# Patient Record
Sex: MALE | Race: WHITE | URBAN - METROPOLITAN AREA
[De-identification: names, ages, dates, MRNs, and addresses within clinical notes are randomized per-mention and may not be internally consistent; named-entity substitution may affect disease eponyms.]

---

## 2019-08-12 ENCOUNTER — Encounter (OUTPATIENT)
Dept: URBAN - METROPOLITAN AREA CLINIC 71 | Facility: CLINIC | Age: 84
End: 2019-08-12
Payer: MEDICARE

## 2019-08-12 PROCEDURE — 92014 COMPRE OPH EXAM EST PT 1/>: CPT | Performed by: OPHTHALMOLOGY

## 2019-08-12 PROCEDURE — 92134 CPTRZ OPH DX IMG PST SGM RTA: CPT | Performed by: OPHTHALMOLOGY

## 2019-08-26 ENCOUNTER — Encounter (OUTPATIENT)
Dept: URBAN - METROPOLITAN AREA CLINIC 72 | Facility: CLINIC | Age: 84
End: 2019-08-26
Payer: MEDICARE

## 2020-03-12 ENCOUNTER — OFFICE VISIT (OUTPATIENT)
Dept: URBAN - METROPOLITAN AREA CLINIC 71 | Facility: CLINIC | Age: 85
End: 2020-03-12
Payer: MEDICARE

## 2020-03-12 DIAGNOSIS — H02.135 SENILE ECTROPION OF LEFT LOWER EYELID: ICD-10-CM

## 2020-03-12 DIAGNOSIS — H35.3131 NONEXUDATIVE AGE-RELATED MACULAR DEGENERATION, BILATERAL, EARLY DRY STAGE: Primary | ICD-10-CM

## 2020-03-12 PROCEDURE — 99213 OFFICE O/P EST LOW 20 MIN: CPT | Performed by: OPHTHALMOLOGY

## 2020-03-12 PROCEDURE — 92134 CPTRZ OPH DX IMG PST SGM RTA: CPT | Performed by: OPHTHALMOLOGY

## 2020-03-12 RX ORDER — METOPROLOL TARTRATE 25 MG/1
25 MG TABLET, FILM COATED ORAL
Qty: 0 | Refills: 0 | Status: ACTIVE
Start: 2020-03-12

## 2020-03-12 ASSESSMENT — INTRAOCULAR PRESSURE
OS: 9
OD: 6

## 2020-03-12 ASSESSMENT — KERATOMETRY
OD: 43.88
OS: 43.75

## 2020-09-16 ENCOUNTER — OFFICE VISIT (OUTPATIENT)
Dept: URBAN - METROPOLITAN AREA CLINIC 71 | Facility: CLINIC | Age: 85
End: 2020-09-16
Payer: MEDICARE

## 2020-09-16 PROCEDURE — 92014 COMPRE OPH EXAM EST PT 1/>: CPT | Performed by: OPHTHALMOLOGY

## 2020-09-16 PROCEDURE — 92134 CPTRZ OPH DX IMG PST SGM RTA: CPT | Performed by: OPHTHALMOLOGY

## 2020-09-16 ASSESSMENT — KERATOMETRY
OD: 43.88
OS: 44.00

## 2020-09-16 ASSESSMENT — INTRAOCULAR PRESSURE
OD: 8
OS: 10

## 2020-09-16 NOTE — IMPRESSION/PLAN
Impression: Nonexudative age-related macular degeneration, bilateral, intermediate dry stage: H35.3132. OCt ordered today 9/16/20 200/200 stable no progression Plan: Discussed diagnosis in detail with patient. Discussed treatment options with patient. Vitamins recommended. Use of [place]msler grid was explained. Will continue to observe condition and or symptoms. Call if [address] worsens.

## 2020-09-16 NOTE — IMPRESSION/PLAN
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 Plan: Discussed the surgery of having the ectropions fixed and if he choose to move forward would refer him out to Dr Naga Membreno for an eval and discussion of the ectropion surgery.

## 2021-05-20 ENCOUNTER — OFFICE VISIT (OUTPATIENT)
Dept: URBAN - METROPOLITAN AREA CLINIC 71 | Facility: CLINIC | Age: 86
End: 2021-05-20
Payer: MEDICARE

## 2021-05-20 DIAGNOSIS — H02.132 SENILE ECTROPION OF RIGHT LOWER EYELID: ICD-10-CM

## 2021-05-20 DIAGNOSIS — H35.3132 NONEXUDATIVE AGE-RELATED MACULAR DEGENERATION, BILATERAL, INTERMEDIATE DRY STAGE: Primary | ICD-10-CM

## 2021-05-20 DIAGNOSIS — H43.813 VITREOUS DEGENERATION, BILATERAL: ICD-10-CM

## 2021-05-20 PROCEDURE — 99213 OFFICE O/P EST LOW 20 MIN: CPT | Performed by: OPHTHALMOLOGY

## 2021-05-20 PROCEDURE — 92134 CPTRZ OPH DX IMG PST SGM RTA: CPT | Performed by: OPHTHALMOLOGY

## 2021-05-20 ASSESSMENT — INTRAOCULAR PRESSURE
OD: 9
OS: 10

## 2021-05-20 NOTE — IMPRESSION/PLAN
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 Plan: Discussed the surgery of having the ectropions fixed and if he choose to move forward would refer him out to Dr Moreen Boxer for an eval and discussion of the ectropion surgery. Also discussed having patient put an ointment in the lower lids.

## 2021-05-20 NOTE — IMPRESSION/PLAN
Impression: Nonexudative age-related macular degeneration, bilateral, intermediate dry stage: H35.3132. Plan: Macular degeneration, dry type, appears progressed with decreased vision. 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

## 2021-09-30 ENCOUNTER — OFFICE VISIT (OUTPATIENT)
Dept: URBAN - METROPOLITAN AREA CLINIC 72 | Facility: CLINIC | Age: 86
End: 2021-09-30

## 2021-09-30 DIAGNOSIS — H52.4 PRESBYOPIA: Primary | ICD-10-CM

## 2021-09-30 ASSESSMENT — VISUAL ACUITY
OS: 20/80
OD: 20/50

## 2021-09-30 ASSESSMENT — INTRAOCULAR PRESSURE
OD: 11
OS: 10

## 2022-06-28 ENCOUNTER — OFFICE VISIT (OUTPATIENT)
Dept: URBAN - METROPOLITAN AREA CLINIC 71 | Facility: CLINIC | Age: 87
End: 2022-06-28
Payer: MEDICARE

## 2022-06-28 DIAGNOSIS — H02.135 SENILE ECTROPION OF LEFT LOWER EYELID: ICD-10-CM

## 2022-06-28 DIAGNOSIS — H01.009 BLEPHARITIS OF EYELID: Primary | ICD-10-CM

## 2022-06-28 DIAGNOSIS — H02.132 SENILE ECTROPION OF RIGHT LOWER EYELID: ICD-10-CM

## 2022-06-28 PROCEDURE — 99212 OFFICE O/P EST SF 10 MIN: CPT

## 2022-06-28 RX ORDER — NEOMYCIN SULFATE, POLYMYXIN B SULFATE AND DEXAMETHASONE 3.5; 10000; 1 MG/ML; [USP'U]/ML; MG/ML
SUSPENSION/ DROPS OPHTHALMIC
Qty: 5 | Refills: 0 | Status: ACTIVE
Start: 2022-06-28

## 2022-06-28 ASSESSMENT — INTRAOCULAR PRESSURE
OD: 10
OS: 12

## 2022-06-28 NOTE — IMPRESSION/PLAN
Impression: Blepharitis of eyelid: H01.009. Bilateral. Plan: Discussed. Recommend patient use eye lid scrubs to keep eyelids clean and will have patient start Corbin-Poly-Dex gtts BID OU for 1 week to help inflammation.

## 2022-06-28 NOTE — IMPRESSION/PLAN
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 Plan: Discussed. Will continue to monitor.

## 2022-07-06 ENCOUNTER — OFFICE VISIT (OUTPATIENT)
Dept: URBAN - METROPOLITAN AREA CLINIC 72 | Facility: CLINIC | Age: 87
End: 2022-07-06
Payer: MEDICARE

## 2022-07-06 DIAGNOSIS — H01.009 BLEPHARITIS OF EYELID: Primary | ICD-10-CM

## 2022-07-06 DIAGNOSIS — H04.123 DRY EYE SYNDROME OF BILATERAL LACRIMAL GLANDS: ICD-10-CM

## 2022-07-06 DIAGNOSIS — H02.132 SENILE ECTROPION OF RIGHT LOWER EYELID: ICD-10-CM

## 2022-07-06 DIAGNOSIS — H02.135 SENILE ECTROPION OF LEFT LOWER EYELID: ICD-10-CM

## 2022-07-06 PROCEDURE — 99212 OFFICE O/P EST SF 10 MIN: CPT

## 2022-07-06 ASSESSMENT — INTRAOCULAR PRESSURE
OD: 10
OS: 13

## 2022-07-06 NOTE — IMPRESSION/PLAN
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 Discussed treatment options with patient, including SX, though none is required. Pt voiced understanding and would like to monitor for changes.  Will monitor, pt to RTC if would like re-discuss SX.

## 2022-07-06 NOTE — IMPRESSION/PLAN
Impression: Blepharitis of eyelid: H01.009 Bilateral.

has improved Plan: Discussed DX and today's findings with pt Recommend pt to continue with lid scrubs and warm compresses Pt to finish course of Corbin/Poly/Dex gtts then D/c Pt understands

## 2022-08-17 ENCOUNTER — OFFICE VISIT (OUTPATIENT)
Dept: URBAN - METROPOLITAN AREA CLINIC 72 | Facility: CLINIC | Age: 87
End: 2022-08-17
Payer: MEDICARE

## 2022-08-17 DIAGNOSIS — H02.132 SENILE ECTROPION OF RIGHT LOWER EYELID: ICD-10-CM

## 2022-08-17 DIAGNOSIS — H04.123 DRY EYE SYNDROME OF BILATERAL LACRIMAL GLANDS: ICD-10-CM

## 2022-08-17 DIAGNOSIS — H43.813 VITREOUS DEGENERATION, BILATERAL: ICD-10-CM

## 2022-08-17 DIAGNOSIS — H35.3133 NONEXUDATIVE AGE-RELATED MACULAR DEGENERATION, BILATERAL, ADVANCED ATROPHIC WITHOUT SUBFOVEAL INVOLVEMENT: Primary | ICD-10-CM

## 2022-08-17 DIAGNOSIS — H02.135 SENILE ECTROPION OF LEFT LOWER EYELID: ICD-10-CM

## 2022-08-17 PROCEDURE — 99213 OFFICE O/P EST LOW 20 MIN: CPT

## 2022-08-17 PROCEDURE — 92134 CPTRZ OPH DX IMG PST SGM RTA: CPT

## 2022-08-17 ASSESSMENT — INTRAOCULAR PRESSURE
OS: 12
OD: 12

## 2022-08-17 NOTE — IMPRESSION/PLAN
Impression: Senile ectropion of right lower eyelid: H02.132. Plan: Discussed diagnosis in detail with patient. Will continue to observe condition and or symptoms.

## 2022-08-17 NOTE — IMPRESSION/PLAN
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 OCT ordered and done today Geographic atrophy drusen and pigment clumping 
stable from last year Plan: Discussed DX and today's findings, including testing, in detail with pt Explained to pt that there is no treatment for Dry AMD 
Discussed vitamins/AREDs with pt. Due to stage of AMD recommend pt start/continue with supplement Recommend DFE and OCT every 6 months Pt understands

## 2022-08-17 NOTE — IMPRESSION/PLAN
Impression: Vitreous degeneration, bilateral: H43.813. Plan: Discussed diagnosis in detail with patient. No treatment is required or recommended at this time. Discussed signs and symptoms of PVD/floaters. Discussed signs and symptoms of retinal detachment.  Will continue to observe condition and or symptoms yearly with DFE